# Patient Record
Sex: FEMALE | Race: WHITE
[De-identification: names, ages, dates, MRNs, and addresses within clinical notes are randomized per-mention and may not be internally consistent; named-entity substitution may affect disease eponyms.]

---

## 2019-01-25 ENCOUNTER — HOSPITAL ENCOUNTER (OUTPATIENT)
Dept: HOSPITAL 7 - FB.SDS | Age: 49
Discharge: HOME | End: 2019-01-25
Attending: SURGERY
Payer: COMMERCIAL

## 2019-01-25 DIAGNOSIS — E66.9: ICD-10-CM

## 2019-01-25 DIAGNOSIS — I10: ICD-10-CM

## 2019-01-25 DIAGNOSIS — Z87.891: ICD-10-CM

## 2019-01-25 DIAGNOSIS — Z12.11: Primary | ICD-10-CM

## 2019-01-25 NOTE — PCM.OPNOTE
- General Post-Op/Procedure Note


Date of Surgery/Procedure: 01/25/19


Operative Procedure(s): c scope


Findings: 





nl exam 


Pre Op Diagnosis: colon cancer screening


Post-Op Diagnosis: nl exam


Anesthesia Technique: MAC


Primary Surgeon: Wood Cooper


Anesthesia Provider: Alisha Chappell


Pathology: 





none


Complications: None


Condition: Good


Free Text/Narrative:: 





see dictation

## 2019-01-25 NOTE — OR
DATE OF OPERATION:  01/25/2019

 

SURGEON:  Wood Cooper MD

 

PROCEDURE PERFORMED:  Colonoscopy.

 

PREOPERATIVE DIAGNOSIS:  Colon cancer screening.

 

POSTOPERATIVE DIAGNOSIS:  Normal colon.

 

INDICATIONS FOR PROCEDURE:  This is a 48-year-old white female who presents for

initial screening colonoscopy.  She was offered and accepted same.

 

DESCRIPTION OF OPERATION:  After an excellent IV sedation was administered,

digital rectal exam was performed.  No marked abnormality was noted.  Flexible

colonoscope was inserted and advanced to the cecum without difficulty.  Prep was

excellent.  The following findings were noted.  Ascending colon, unremarkable.

Transverse colon, unremarkable.  Descending colon, unremarkable.  Sigmoid and

rectum, unremarkable.  The patient tolerated the procedure well and was taken to

Recovery.  Repeat colonoscopy in 10 years.

 

Job#: [id number]

DD: 01/25/2019 0955

DT: 01/25/2019 1332 AS/MODL

## 2019-06-14 ENCOUNTER — HOSPITAL ENCOUNTER (EMERGENCY)
Dept: HOSPITAL 7 - FB.ED | Age: 49
Discharge: HOME | End: 2019-06-14
Payer: COMMERCIAL

## 2019-06-14 DIAGNOSIS — E66.9: ICD-10-CM

## 2019-06-14 DIAGNOSIS — Z79.82: ICD-10-CM

## 2019-06-14 DIAGNOSIS — Z79.899: ICD-10-CM

## 2019-06-14 DIAGNOSIS — I10: ICD-10-CM

## 2019-06-14 DIAGNOSIS — R00.0: Primary | ICD-10-CM

## 2019-06-14 PROCEDURE — 99285 EMERGENCY DEPT VISIT HI MDM: CPT

## 2019-06-14 PROCEDURE — 93005 ELECTROCARDIOGRAM TRACING: CPT

## 2019-06-14 PROCEDURE — 85025 COMPLETE CBC W/AUTO DIFF WBC: CPT

## 2019-06-14 PROCEDURE — 36415 COLL VENOUS BLD VENIPUNCTURE: CPT

## 2019-06-14 PROCEDURE — 84443 ASSAY THYROID STIM HORMONE: CPT

## 2019-06-14 PROCEDURE — 84484 ASSAY OF TROPONIN QUANT: CPT

## 2019-06-14 PROCEDURE — 80053 COMPREHEN METABOLIC PANEL: CPT

## 2019-06-14 NOTE — EDM.PDOC
ED HPI GENERAL MEDICAL PROBLEM





- General


Stated Complaint: RAPID HEART RATE


Time Seen by Provider: 19 21:25


Source of Information: Reports: Patient


History Limitations: Reports: No Limitations





- History of Present Illness


INITIAL COMMENTS - FREE TEXT/NARRATIVE: 





pt comes in ambulatory from home with concerns for palpitation X 1 hr , report 

with this chronic recurrent substernal chest pain that she had for several 

years on and off, pt report Hx of sinus tachycardia and Hx of extensive non-

diagnostic cardiac work up , denies any new types of chest pain, report lower 

back pain and jaw pain that she was seen for yesterday at clinic and treated 

with Flexeril and Motrin. 


pt report running out of her Toprol roday. 


  ** L anterior chest, back & jaw


Pain Score (Numeric/FACES): 4





- Related Data


 Allergies











Allergy/AdvReac Type Severity Reaction Status Date / Time


 


No Known Allergies Allergy   Verified 19 08:17











Home Meds: 


 Home Meds





Aspirin 325 mg PO ASDIRECTED PRN 19 [History]


Metoprolol Succinate [Toprol Xl] 25 mg PO DAILY 19 [History]


Cyclobenzaprine [Flexeril] 10 mg PO DAILY 19 [History]











Past Medical History


HEENT History: Reports: Impaired Vision


Cardiovascular History: Reports: Arrhythmia, Hypertension


Respiratory History: Reports: None


Gastrointestinal History: Reports: None


Genitourinary History: Reports: None


OB/GYN History: Reports: Pregnancy


Other OB/GYN History:  V,  PARA III, AB II


Musculoskeletal History: Reports: None


Neurological History: Reports: None


Psychiatric History: Reports: None


Endocrine/Metabolic History: Reports: Obesity/BMI 30+


Hematologic History: Reports: None


Immunologic History: Reports: None


Oncologic (Cancer) History: Reports: None


Dermatologic History: Reports: None





- Infectious Disease History


Infectious Disease History: Reports: Chicken Pox, Shingles





- Past Surgical History


Head Surgeries/Procedures: Reports: None


HEENT Surgical History: Reports: None


Cardiovascular Surgical History: Reports: None


Respiratory Surgical History: Reports: None


GI Surgical History: Reports: None


Female  Surgical History: Reports: Hysterectomy, Tubal Ligation


Musculoskeletal Surgical History: Reports: None





Social & Family History





- Family History


GI: Reports: None


Endocrine/Metabolic: Reports: Diabetes, type II





- Caffeine Use


Caffeine Use: Reports: Coffee





ED ROS GENERAL





- Review of Systems


Review Of Systems: See Below


Constitutional: Reports: No Symptoms.  Denies: Fever, Fatigue


HEENT: Reports: No Symptoms


Respiratory: Reports: No Symptoms


Cardiovascular: Reports: Chest Pain, Palpitations.  Denies: Dyspnea on Exertion

, Edema, Syncope


GI/Abdominal: Reports: No Symptoms.  Denies: Nausea


: Reports: No Symptoms


Musculoskeletal: Reports: Back Pain


Skin: Reports: No Symptoms


Neurological: Reports: No Symptoms


Psychiatric: Reports: No Symptoms





ED EXAM, GENERAL





- Physical Exam


Exam: See Below


Exam Limited By: No Limitations


General Appearance: Alert, No Apparent Distress.  No: Mild Distress


Eye Exam: Bilateral Eye: Normal Inspection


Nose: Normal Inspection


Throat/Mouth: Normal Inspection, Normal Oropharynx


Respiratory/Chest: No Respiratory Distress, Lungs Clear, Normal Breath Sounds


Cardiovascular: Normal Peripheral Pulses, Regular Rate, Rhythm.  No: No Edema, 

No Gallop, No JVD, No Murmur


GI/Abdominal: Normal Bowel Sounds, Soft, Non-Tender, No Distention


Extremities: Normal Inspection.  No: Pedal Edema


Neurological: Alert, Oriented, CN II-XII Intact


Skin Exam: Warm, Dry, No Rash





Course





- Vital Signs


Text/Narrative:: 


 EKG shows no acute changes and HR is WNL, vitals are stable except for mod 

elevation with SBP , and pt here is comfortable .


pt was given metoprolol. 


labs were unremarkable except for elevated TSH .


pt has chronic recurrent chest pain which i feel anxiety related, she is 

medically stable in this regards.


will dc pt home with prescription on metoprolol and instructions to follow with 

PCP on Monday for recheck 


Last Recorded V/S: 


 Last Vital Signs











Temp  36.7 C   19 21:05


 


Pulse  100   19 21:05


 


Resp  18   19 21:05


 


BP  165/102 H  19 21:05


 


Pulse Ox  96   19 21:05














- Orders/Labs/Meds


Orders: 


 Active Orders 24 hr











 Category Date Time Status


 


 EKG Documentation Completion [RC] ASDIRECTED Care  19 21:42 Active


 


 Metoprolol Succinate [Toprol XL] Med  06/15/19 09:00 Ordered





 25 mg PO DAILY   


 


 EKG 12 Lead [EK] Routine Ther  19 21:41 Ordered








 Medication Orders





Metoprolol Succinate (Toprol Xl)  25 mg PO DAILY NILDA








Labs: 


 Laboratory Tests











  06/19 Range/Units





  21:14 21:14 21:14 


 


WBC  10.2    (4.5-12.0)  X10-3/uL


 


RBC  4.49    (3.23-5.20)  x10(6)uL


 


Hgb  14.2    (11.5-15.5)  g/dL


 


Hct  41.4    (30.0-51.3)  %


 


MCV  92.3    (80-96)  fL


 


MCH  31.7    (27.7-33.6)  pg


 


MCHC  34.3    (32.2-35.4)  g/dL


 


RDW  12.3    (11.5-15.5)  %


 


Plt Count  237    (125-369)  X10(3)uL


 


MPV  8.6    (7.4-10.4)  fL


 


Neut % (Auto)  60.3    (46-82)  %


 


Lymph % (Auto)  30.7    (13-37)  %


 


Mono % (Auto)  4.8    (4-12)  %


 


Eos % (Auto)  3    (1.0-5.0)  %


 


Baso % (Auto)  1    (0-2)  %


 


Neut # (Auto)  6.2    (1.6-8.3)  #


 


Lymph # (Auto)  3.1    (0.6-5.0)  #


 


Mono # (Auto)  0.5    (0.0-1.3)  #


 


Eos # (Auto)  0.3    (0.0-0.8)  #


 


Baso # (Auto)  0.1    (0.0-0.2)  #


 


Sodium   139   (135-145)  mmol/L


 


Potassium   3.7   (3.5-5.3)  mmol/L


 


Chloride   104   (100-110)  mmol/L


 


Carbon Dioxide   25   (21-32)  mmol/L


 


BUN   14   (7-18)  mg/dL


 


Creatinine   1.1 H   (0.55-1.02)  mg/dL


 


Est Cr Clr Drug Dosing   61.72   mL/min


 


Estimated GFR (MDRD)   53 L   (>60)  


 


BUN/Creatinine Ratio   12.7   (9-20)  


 


Glucose   99   ()  mg/dL


 


Calcium   9.2   (8.6-10.2)  mg/dL


 


Total Bilirubin   0.4   (0.1-1.3)  mg/dL


 


AST   29 H   (5-25)  IU/L


 


ALT   35   (12-36)  U/L


 


Alkaline Phosphatase   73   ()  IU/L


 


Troponin I    < 0.017 L  (<0.017-0.056)  ng/mL


 


Total Protein   7.5   (6.0-8.0)  g/dL


 


Albumin   3.9   (3.5-5.2)  g/dL


 


Globulin   3.6   g/dL


 


Albumin/Globulin Ratio   1.1   


 


TSH, Ultra Sensitive     (0.36-3.74)  IU/mL














  19 Range/Units





  21:14 


 


WBC   (4.5-12.0)  X10-3/uL


 


RBC   (3.23-5.20)  x10(6)uL


 


Hgb   (11.5-15.5)  g/dL


 


Hct   (30.0-51.3)  %


 


MCV   (80-96)  fL


 


MCH   (27.7-33.6)  pg


 


MCHC   (32.2-35.4)  g/dL


 


RDW   (11.5-15.5)  %


 


Plt Count   (125-369)  X10(3)uL


 


MPV   (7.4-10.4)  fL


 


Neut % (Auto)   (46-82)  %


 


Lymph % (Auto)   (13-37)  %


 


Mono % (Auto)   (4-12)  %


 


Eos % (Auto)   (1.0-5.0)  %


 


Baso % (Auto)   (0-2)  %


 


Neut # (Auto)   (1.6-8.3)  #


 


Lymph # (Auto)   (0.6-5.0)  #


 


Mono # (Auto)   (0.0-1.3)  #


 


Eos # (Auto)   (0.0-0.8)  #


 


Baso # (Auto)   (0.0-0.2)  #


 


Sodium   (135-145)  mmol/L


 


Potassium   (3.5-5.3)  mmol/L


 


Chloride   (100-110)  mmol/L


 


Carbon Dioxide   (21-32)  mmol/L


 


BUN   (7-18)  mg/dL


 


Creatinine   (0.55-1.02)  mg/dL


 


Est Cr Clr Drug Dosing   mL/min


 


Estimated GFR (MDRD)   (>60)  


 


BUN/Creatinine Ratio   (9-20)  


 


Glucose   ()  mg/dL


 


Calcium   (8.6-10.2)  mg/dL


 


Total Bilirubin   (0.1-1.3)  mg/dL


 


AST   (5-25)  IU/L


 


ALT   (12-36)  U/L


 


Alkaline Phosphatase   ()  IU/L


 


Troponin I   (<0.017-0.056)  ng/mL


 


Total Protein   (6.0-8.0)  g/dL


 


Albumin   (3.5-5.2)  g/dL


 


Globulin   g/dL


 


Albumin/Globulin Ratio   


 


TSH, Ultra Sensitive  8.52 H*  (0.36-3.74)  IU/mL











Meds: 


Medications











Generic Name Dose Route Start Last Admin





  Trade Name Trina  PRN Reason Stop Dose Admin


 


Metoprolol Succinate  25 mg  06/15/19 09:00  





  Toprol Xl  PO   





  DAILY NILDA   





     





     





     





     














Departure





- Departure


Time of Disposition: 23:00


Disposition: Home, Self-Care 01


Clinical Impression: 


 Tachycardia








- Discharge Information


Prescriptions: 


Metoprolol Succinate 25 mg PO DAILY #30 tab.er.24h


Referrals: 


PCP,None [Primary Care Provider] - 





- Problem List & Annotations


(1) Sinus tachycardia


SNOMED Code(s): 24419177


   Code(s): R00.0 - TACHYCARDIA, UNSPECIFIED   Status: Acute   Current Visit: 

Yes   





- My Orders


Last 24 Hours: 


My Active Orders





19 21:41


EKG 12 Lead [EK] Routine 





19 21:42


EKG Documentation Completion [RC] ASDIRECTED 





06/15/19 09:00


Metoprolol Succinate [Toprol XL]   25 mg PO DAILY 














- Assessment/Plan


Last 24 Hours: 


My Active Orders





19 21:41


EKG 12 Lead [EK] Routine 





19 21:42


EKG Documentation Completion [RC] ASDIRECTED 





06/15/19 09:00


Metoprolol Succinate [Toprol XL]   25 mg PO DAILY